# Patient Record
Sex: FEMALE | Race: WHITE | NOT HISPANIC OR LATINO | URBAN - METROPOLITAN AREA
[De-identification: names, ages, dates, MRNs, and addresses within clinical notes are randomized per-mention and may not be internally consistent; named-entity substitution may affect disease eponyms.]

---

## 2018-07-27 ENCOUNTER — OFFICE VISIT (OUTPATIENT)
Dept: URBAN - METROPOLITAN AREA CLINIC 29 | Facility: CLINIC | Age: 83
End: 2018-07-27
Payer: COMMERCIAL

## 2018-07-27 PROCEDURE — 99213 OFFICE O/P EST LOW 20 MIN: CPT | Performed by: OPHTHALMOLOGY

## 2018-07-27 RX ORDER — DORZOLAMIDE HCL 20 MG/ML
2 % SOLUTION/ DROPS OPHTHALMIC
Qty: 10 | Refills: 3 | Status: INACTIVE
Start: 2018-07-27 | End: 2019-03-04

## 2018-07-27 ASSESSMENT — INTRAOCULAR PRESSURE
OS: 18
OD: 14

## 2018-07-27 NOTE — IMPRESSION/PLAN
Impression: Primary open-angle glaucoma, bilateral, severe stage. Code: E54.9183. CCT average OU.- IOP OS elevated - Trabeculectomy OU- ONH stable ou --PCIOL heptic OS Plan: Discussed diagnosis, explained and understood by patient. Discussed IOP/ONH/Glaucoma management and risks. Continue massage ou.  start dorzolamide tid OS. discussed side-effects of glaucoma meds. -
follow-up with Dr. Enrrique Seay for retinal edema and injections as instructed. will continue to monitor condition and symptoms.

## 2018-09-25 ENCOUNTER — OFFICE VISIT (OUTPATIENT)
Dept: URBAN - METROPOLITAN AREA CLINIC 29 | Facility: CLINIC | Age: 83
End: 2018-09-25
Payer: COMMERCIAL

## 2018-09-25 PROCEDURE — 99213 OFFICE O/P EST LOW 20 MIN: CPT | Performed by: OPHTHALMOLOGY

## 2018-09-25 ASSESSMENT — INTRAOCULAR PRESSURE
OS: 15
OD: 14

## 2019-01-25 ENCOUNTER — OFFICE VISIT (OUTPATIENT)
Dept: URBAN - METROPOLITAN AREA CLINIC 29 | Facility: CLINIC | Age: 84
End: 2019-01-25
Payer: COMMERCIAL

## 2019-01-25 PROCEDURE — 99213 OFFICE O/P EST LOW 20 MIN: CPT | Performed by: OPHTHALMOLOGY

## 2019-01-25 RX ORDER — NETARSUDIL 0.2 MG/ML
0.02 % SOLUTION/ DROPS OPHTHALMIC; TOPICAL
Qty: 5 | Refills: 5 | Status: INACTIVE
Start: 2019-01-25 | End: 2019-03-05

## 2019-01-25 ASSESSMENT — INTRAOCULAR PRESSURE
OS: 23
OD: 15

## 2019-01-25 NOTE — IMPRESSION/PLAN
Impression: Primary open-angle glaucoma, bilateral, severe stage. Code: B04.7521. CCT average OU.- IOP doing well OD - IOP OS borderline - Trabeculectomy OU- ONH stable ou --PCIOL haptic OS Plan: Discussed diagnosis, explained and understood by patient. Discussed IOP/ONH/Glaucoma management and risks. Continue massage ou.  continue dorzolamide tid OS. 
start rhopressa OS qhs. sample given. discussed side-effects of glaucoma meds. Discussed ALT OS to lower IOP. follow-up with Dr. Clayton Lopez for retinal edema and injections as instructed. will continue to monitor condition and symptoms.

## 2019-03-05 ENCOUNTER — OFFICE VISIT (OUTPATIENT)
Dept: URBAN - METROPOLITAN AREA CLINIC 29 | Facility: CLINIC | Age: 84
End: 2019-03-05
Payer: COMMERCIAL

## 2019-03-05 PROCEDURE — 99213 OFFICE O/P EST LOW 20 MIN: CPT | Performed by: OPHTHALMOLOGY

## 2019-03-05 RX ORDER — NETARSUDIL 0.2 MG/ML
0.02 % SOLUTION/ DROPS OPHTHALMIC; TOPICAL
Qty: 5 | Refills: 5 | Status: INACTIVE
Start: 2019-03-05 | End: 2019-05-24

## 2019-03-05 ASSESSMENT — INTRAOCULAR PRESSURE
OD: 19
OS: 24

## 2019-04-02 ENCOUNTER — OFFICE VISIT (OUTPATIENT)
Dept: URBAN - METROPOLITAN AREA CLINIC 29 | Facility: CLINIC | Age: 84
End: 2019-04-02
Payer: COMMERCIAL

## 2019-04-02 PROCEDURE — 99213 OFFICE O/P EST LOW 20 MIN: CPT | Performed by: OPHTHALMOLOGY

## 2019-04-02 ASSESSMENT — INTRAOCULAR PRESSURE
OD: 17
OS: 29

## 2019-04-02 NOTE — IMPRESSION/PLAN
Impression: Primary open-angle glaucoma, bilateral, severe stage. Code: H52.5922. CCT average OU
trabeculectomy OU PCIOL haptic OS
IOP stable OD, elevated OS Plan: Discussed diagnosis, explained and understood by patient. Discussed IOP/ONH/Glaucoma management and risks. Continue massage ou. D/C rhopressa, not effective in lowering IOP. Recommends Trans-scleral CPC diode with micro pulse OS laser procedure to possibly lower IOP. Patient elects [facility] laser procedure. Discussed RBA's and treatment options. RL=2 -start prednisolone qid after laser.

## 2019-04-23 ENCOUNTER — SURGERY (OUTPATIENT)
Dept: URBAN - METROPOLITAN AREA SURGERY 11 | Facility: SURGERY | Age: 84
End: 2019-04-23
Payer: COMMERCIAL

## 2019-04-23 PROCEDURE — 66710 CILIARY TRANSSLERAL THERAPY: CPT | Performed by: OPHTHALMOLOGY

## 2019-05-03 ENCOUNTER — POST-OPERATIVE VISIT (OUTPATIENT)
Dept: URBAN - METROPOLITAN AREA CLINIC 29 | Facility: CLINIC | Age: 84
End: 2019-05-03

## 2019-05-03 PROCEDURE — 99024 POSTOP FOLLOW-UP VISIT: CPT | Performed by: OPHTHALMOLOGY

## 2019-05-03 ASSESSMENT — INTRAOCULAR PRESSURE
OS: 15
OD: 15

## 2019-05-24 ENCOUNTER — POST-OPERATIVE VISIT (OUTPATIENT)
Dept: URBAN - METROPOLITAN AREA CLINIC 29 | Facility: CLINIC | Age: 84
End: 2019-05-24

## 2019-05-24 PROCEDURE — 99024 POSTOP FOLLOW-UP VISIT: CPT | Performed by: OPTOMETRIST

## 2019-05-24 ASSESSMENT — INTRAOCULAR PRESSURE
OS: 32
OD: 16

## 2019-06-07 ENCOUNTER — POST-OPERATIVE VISIT (OUTPATIENT)
Dept: URBAN - METROPOLITAN AREA CLINIC 29 | Facility: CLINIC | Age: 84
End: 2019-06-07
Payer: COMMERCIAL

## 2019-06-07 DIAGNOSIS — Z09 ENCNTR FOR F/U EXAM AFT TRTMT FOR COND OTH THAN MALIG NEOPLM: Primary | ICD-10-CM

## 2019-06-07 PROCEDURE — 99024 POSTOP FOLLOW-UP VISIT: CPT | Performed by: OPHTHALMOLOGY

## 2019-06-07 ASSESSMENT — INTRAOCULAR PRESSURE
OS: 22
OD: 15

## 2019-07-12 ENCOUNTER — POST-OPERATIVE VISIT (OUTPATIENT)
Dept: URBAN - METROPOLITAN AREA CLINIC 29 | Facility: CLINIC | Age: 84
End: 2019-07-12
Payer: COMMERCIAL

## 2019-07-12 PROCEDURE — 99024 POSTOP FOLLOW-UP VISIT: CPT | Performed by: OPHTHALMOLOGY

## 2019-07-12 ASSESSMENT — INTRAOCULAR PRESSURE
OD: 16
OS: 17

## 2019-09-06 ENCOUNTER — OFFICE VISIT (OUTPATIENT)
Dept: URBAN - METROPOLITAN AREA CLINIC 29 | Facility: CLINIC | Age: 84
End: 2019-09-06
Payer: COMMERCIAL

## 2019-09-06 DIAGNOSIS — H40.1133 PRIMARY OPEN-ANGLE GLAUCOMA, BILATERAL, SEVERE STAGE: Primary | ICD-10-CM

## 2019-09-06 PROCEDURE — 99213 OFFICE O/P EST LOW 20 MIN: CPT | Performed by: OPHTHALMOLOGY

## 2019-09-06 RX ORDER — BRIMONIDINE TARTRATE 1 MG/ML
0.1 % SOLUTION/ DROPS OPHTHALMIC
Qty: 10 | Refills: 5 | Status: INACTIVE
Start: 2019-09-06 | End: 2019-10-04

## 2019-09-06 ASSESSMENT — INTRAOCULAR PRESSURE
OS: 24
OD: 16

## 2019-09-06 NOTE — IMPRESSION/PLAN
Impression: Primary open-angle glaucoma, bilateral, severe stage. Code: E89.4350. CCT average OU -- trabeculectomy OU - rhopressa not effective at lowering IOP - PCIOL haptic OS - IOP OS still too high -
CPC laser OS 4/23/19 - Plan: Discussed diagnosis, IOP/ONH/Glaucoma management and risks. continue dorzolamide tid OS and start alphagan p tid OS. samples given. discussed side effects of glaucoma meds - 
discussed aqueous shunt OS if medication not effective. will continue to monitor condition and symptoms.

## 2019-10-04 ENCOUNTER — OFFICE VISIT (OUTPATIENT)
Dept: URBAN - METROPOLITAN AREA CLINIC 29 | Facility: CLINIC | Age: 84
End: 2019-10-04
Payer: COMMERCIAL

## 2019-10-04 PROCEDURE — 99213 OFFICE O/P EST LOW 20 MIN: CPT | Performed by: OPHTHALMOLOGY

## 2019-10-04 RX ORDER — BRIMONIDINE TARTRATE 1 MG/ML
0.1 % SOLUTION/ DROPS OPHTHALMIC
Qty: 10 | Refills: 11 | Status: INACTIVE
Start: 2019-10-04 | End: 2020-01-10

## 2019-10-04 ASSESSMENT — INTRAOCULAR PRESSURE
OD: 17
OS: 15

## 2019-10-04 NOTE — IMPRESSION/PLAN
Impression: Primary open-angle glaucoma, bilateral, severe stage. Code: G25.7159. CCT average OU Trabeculectomy OU 
CPC laser OS 4/23/19 Rhopressa not effective in lowering IOP Plan: Discussed diagnosis, explained and understood by patient. Discussed IOP/ONH/Glaucoma management and risks. Continue dorzolamide tid os and alphagan tid os. Will continue to monitor condition and symptoms.

## 2020-01-10 ENCOUNTER — OFFICE VISIT (OUTPATIENT)
Dept: URBAN - METROPOLITAN AREA CLINIC 29 | Facility: CLINIC | Age: 85
End: 2020-01-10
Payer: COMMERCIAL

## 2020-01-10 PROCEDURE — 99213 OFFICE O/P EST LOW 20 MIN: CPT | Performed by: OPHTHALMOLOGY

## 2020-01-10 RX ORDER — BRIMONIDINE TARTRATE 1.5 MG/ML
0.15 % SOLUTION OPHTHALMIC
Qty: 10 | Refills: 3 | Status: INACTIVE
Start: 2020-01-10 | End: 2021-04-27

## 2020-01-10 RX ORDER — BIMATOPROST 0.1 MG/ML
0.01 % SOLUTION/ DROPS OPHTHALMIC
Qty: 2.5 | Refills: 11 | Status: INACTIVE
Start: 2020-01-10 | End: 2020-02-21

## 2020-01-10 ASSESSMENT — INTRAOCULAR PRESSURE
OD: 17
OS: 21

## 2020-02-21 ENCOUNTER — OFFICE VISIT (OUTPATIENT)
Dept: URBAN - METROPOLITAN AREA CLINIC 29 | Facility: CLINIC | Age: 85
End: 2020-02-21
Payer: COMMERCIAL

## 2020-02-21 DIAGNOSIS — H27.112 SUBLUXATION OF LENS, LEFT EYE: ICD-10-CM

## 2020-02-21 DIAGNOSIS — H35.3232 EXUDATIVE AGE-RELATED MACULAR DEGENERATION, BILATERAL, WITH INACTIVE CHOROIDAL NEOVASCULARIZATION: ICD-10-CM

## 2020-02-21 PROCEDURE — 92014 COMPRE OPH EXAM EST PT 1/>: CPT | Performed by: OPHTHALMOLOGY

## 2020-02-21 PROCEDURE — 92133 CPTRZD OPH DX IMG PST SGM ON: CPT | Performed by: OPHTHALMOLOGY

## 2020-02-21 PROCEDURE — 92083 EXTENDED VISUAL FIELD XM: CPT | Performed by: OPHTHALMOLOGY

## 2020-02-21 PROCEDURE — 92020 GONIOSCOPY: CPT | Performed by: OPHTHALMOLOGY

## 2020-02-21 RX ORDER — BIMATOPROST 0.1 MG/ML
0.01 % SOLUTION/ DROPS OPHTHALMIC
Qty: 3 | Refills: 3 | Status: INACTIVE
Start: 2020-02-21 | End: 2021-04-27

## 2020-02-21 RX ORDER — PREDNISOLONE ACETATE 10 MG/ML
1 % SUSPENSION/ DROPS OPHTHALMIC
Qty: 1 | Refills: 1 | Status: INACTIVE
Start: 2020-02-21 | End: 2020-08-18

## 2020-02-21 ASSESSMENT — INTRAOCULAR PRESSURE
OD: 21
OS: 24

## 2020-02-21 NOTE — IMPRESSION/PLAN
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  Plan: Subluxation of IOL OS - pt not interested in surgical intervention

## 2020-02-21 NOTE — IMPRESSION/PLAN
Impression: Exudative age-related macular degeneration, bilateral, with inactive choroidal neovascularization: H35.3806. Plan:  Actively seeing outside provider for injections dc planning home today  left msg to tr Vonnie  spoke to wife Edwina on phone and updated status-    Sandra Buckley MD   [facility]

## 2020-02-21 NOTE — IMPRESSION/PLAN
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 Trabeculectomy OU, [address] Lx OS 04/23/19 Advanced Wet AMD OU - s/p injections Plan: Pt has pseduoexfoliative Glc Gonio :  cbb 1-2+ pg ou       Pachs:  512/521    Today's IOP :  21, 24        // Tmax & date :  39, 43 Target IOP low to mid teens Pt is CF OU
[facility] (02/21/2020) Complete loss ou 
C/D:  .8-. 8 ARMD with CR scarring with exudates / .9-.9 ARMD with CR scarring OCT: Unable to perform OCT 2/2 [facility] Pt denies Sulfa Allergy   // Pt denies Lung /Heart dx Pt is currently using : Lumigan qhs os, Brimonidine tid os, Dorzolamide tid os Previously used medications : Rhopressa - no change in IOP Plan :
1. Cont Lumigan qhs os (burning with use) - start ATs 5 minutes after 2. Cont Brimonidine tid os 3. Cont Dorzolamide tid os 4. Patient's IOP is elevated upon today's exam, Given patient surgical history and [facility] recommend SLT OU. The patient is aware of the limitations of SLT , which can lower IOP 2-4mm for 6--12 months. The Patient is aware that SLT cannot improve the vision nor eliminate the need for the topical medications. If on any glaucoma medications, the patient is aware that SLT is not a replacement for the current medical regimen. *Pred TID  3 days (sent to Pharmacy) **Risk level 1
*** 6-8 week Follow up after laser 5. If SLT does not improve IOP ; may warrant more medication or surgical intervention - high risk based on advanced disease and age 6.  Schedule SLT OD then OS

## 2020-03-03 ENCOUNTER — PROCEDURE (OUTPATIENT)
Dept: URBAN - METROPOLITAN AREA CLINIC 23 | Facility: CLINIC | Age: 85
End: 2020-03-03
Payer: COMMERCIAL

## 2020-03-03 PROCEDURE — 65855 TRABECULOPLASTY LASER SURG: CPT | Performed by: OPHTHALMOLOGY

## 2020-08-18 ENCOUNTER — OFFICE VISIT (OUTPATIENT)
Dept: URBAN - METROPOLITAN AREA CLINIC 23 | Facility: CLINIC | Age: 85
End: 2020-08-18
Payer: COMMERCIAL

## 2020-08-18 PROCEDURE — 92014 COMPRE OPH EXAM EST PT 1/>: CPT | Performed by: OPHTHALMOLOGY

## 2020-08-18 ASSESSMENT — INTRAOCULAR PRESSURE
OD: 18
OS: 22

## 2020-12-30 NOTE — IMPRESSION/PLAN
Impression: Primary open-angle glaucoma, bilateral, severe stage. Code: R57.5308. CCT average OU.- IOP doing well OD - IOP OS borderline - Trabeculectomy OU- ONH stable ou --PCIOL haptic OS Plan: Discussed diagnosis, explained and understood by patient. Discussed IOP/ONH/Glaucoma management and risks. Continue massage ou.  continue dorzolamide tid OS. D/C rhopressa OS qhs. Start brimonidine bid os, sample given. Discussed side effects of drops with pt. Follow up with Dr. April Patricio for retinal edema and injections as instructed. Will continue to monitor condition and symptoms.
None known

## 2021-04-27 ENCOUNTER — OFFICE VISIT (OUTPATIENT)
Dept: URBAN - METROPOLITAN AREA CLINIC 23 | Facility: CLINIC | Age: 86
End: 2021-04-27
Payer: MEDICARE

## 2021-04-27 DIAGNOSIS — H40.1432: Primary | ICD-10-CM

## 2021-04-27 PROCEDURE — 99214 OFFICE O/P EST MOD 30 MIN: CPT | Performed by: OPHTHALMOLOGY

## 2021-04-27 RX ORDER — BIMATOPROST 0.1 MG/ML
0.01 % SOLUTION/ DROPS OPHTHALMIC
Qty: 3 | Refills: 3 | Status: ACTIVE
Start: 2021-04-27

## 2021-04-27 RX ORDER — BRIMONIDINE TARTRATE 1.5 MG/ML
0.15 % SOLUTION OPHTHALMIC
Qty: 10 | Refills: 3 | Status: ACTIVE
Start: 2021-04-27

## 2021-04-27 RX ORDER — DORZOLAMIDE HCL 20 MG/ML
2 % SOLUTION/ DROPS OPHTHALMIC
Qty: 10 | Refills: 2 | Status: ACTIVE
Start: 2021-04-27

## 2021-04-27 ASSESSMENT — INTRAOCULAR PRESSURE
OD: 15
OS: 19

## 2021-04-27 NOTE — IMPRESSION/PLAN
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 Trabeculectomy OU, [address] Lx OS 04/23/19 Advanced Wet AMD OU - s/p injections Plan: Pt has pseduoexfoliative Glc Gonio :  cbb 1-2+ pg ou       Pachs:  512/521    Today's IOP : 15/19   Tmax :  36, 42 Target IOP low to mid teens Pt is CF OU
[facility] (02/21/2020) Complete loss ou 
C/D:  .8-. 8 ARMD with CR scarring with exudates / .9-.9 ARMD with CR scarring OCT: Unable to perform OCT 2/2 [facility] Pt denies Sulfa Allergy   // Pt denies Lung /Heart dx Pt is currently using : Lumigan qhs os, Brimonidine tid os, Dorzolamide tid os Previously used medications : Rhopressa - no change in IOP Plan :
1. Cont:
Lumigan qhs os (burning with use) - start ATs 5 minutes after Brimonidine tid os Dorzolamide tid os 2. Patient's IOP has improved OD, patient to proceed with SLT OS. 3.  Recommend SLT  The patient is aware of the limitations of SLT , which can lower IOP 2-4mm for 6--12 months. The Patient is aware that SLT cannot improve the vision nor eliminate the need for the topical medications. If on any glaucoma medications, the patient is aware that SLT is not a replacement for the current medical regimen. **Risk level 1
*** 6-8 week Follow up after laser **Pred TID x 1 week

## 2021-06-01 ENCOUNTER — SURGERY (OUTPATIENT)
Dept: URBAN - METROPOLITAN AREA SURGERY 11 | Facility: SURGERY | Age: 86
End: 2021-06-01
Payer: MEDICARE

## 2021-06-01 PROCEDURE — 65855 TRABECULOPLASTY LASER SURG: CPT | Performed by: OPHTHALMOLOGY

## 2021-07-07 ENCOUNTER — OFFICE VISIT (OUTPATIENT)
Dept: URBAN - METROPOLITAN AREA CLINIC 23 | Facility: CLINIC | Age: 86
End: 2021-07-07
Payer: MEDICARE

## 2021-07-07 PROCEDURE — 99213 OFFICE O/P EST LOW 20 MIN: CPT | Performed by: OPHTHALMOLOGY

## 2021-07-07 ASSESSMENT — INTRAOCULAR PRESSURE
OD: 17
OS: 17

## 2021-07-07 NOTE — IMPRESSION/PLAN
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 Trabeculectomy OU, [facility] Lx OS 04/23/19 Advanced Wet AMD OU - s/p injections Plan: Pt has pseduoexfoliative Glc Gonio :  cbb 1-2+ pg ou       Pachs:  512/521    Today's IOP : 17/17  Tmax :  36, 42 Target IOP low to mid teens Pt is CF OU
[facility] (02/21/2020) Complete loss ou 
C/D:  .8-. 8 ARMD with CR scarring with exudates / .9-.9 ARMD with CR scarring OCT: Unable to perform OCT 2/2 [facility] Pt denies Sulfa Allergy   // Pt denies Lung /Heart dx Pt is currently using : Lumigan qhs os, Brimonidine tid os, Dorzolamide tid os Previously used medications : Rhopressa - no change in IOP Plan :
1. Cont:
Lumigan qhs os (burning with use) - start ATs 5 minutes after Brimonidine tid os Dorzolamide tid os 2. Patient's IOP has improved OS S/P SLT. Will have patient continue drop s as instructed.  
3. RTC 3 months for IOP